# Patient Record
Sex: FEMALE | Race: WHITE | NOT HISPANIC OR LATINO | Employment: FULL TIME | URBAN - NONMETROPOLITAN AREA
[De-identification: names, ages, dates, MRNs, and addresses within clinical notes are randomized per-mention and may not be internally consistent; named-entity substitution may affect disease eponyms.]

---

## 2023-09-06 ENCOUNTER — OFFICE VISIT (OUTPATIENT)
Dept: CARDIOLOGY | Facility: CLINIC | Age: 33
End: 2023-09-06
Payer: MEDICAID

## 2023-09-06 VITALS
HEART RATE: 88 BPM | OXYGEN SATURATION: 99 % | DIASTOLIC BLOOD PRESSURE: 70 MMHG | HEIGHT: 67 IN | BODY MASS INDEX: 37.2 KG/M2 | SYSTOLIC BLOOD PRESSURE: 128 MMHG | WEIGHT: 237 LBS

## 2023-09-06 DIAGNOSIS — G47.00 INSOMNIA, UNSPECIFIED TYPE: ICD-10-CM

## 2023-09-06 DIAGNOSIS — G47.10 HYPERSOMNIA, UNSPECIFIED: Primary | ICD-10-CM

## 2023-09-06 RX ORDER — LAMOTRIGINE 200 MG/1
200 TABLET ORAL DAILY
COMMUNITY
Start: 2023-08-16

## 2023-09-06 RX ORDER — LISDEXAMFETAMINE DIMESYLATE 40 MG/1
40 CAPSULE ORAL EVERY MORNING
COMMUNITY
Start: 2023-08-25

## 2023-09-06 NOTE — PROGRESS NOTES
New Sleep Consult     Date:   2023  Name: Fauzia Khoury  :   1990  PCP: Teresa Reese APRN    Chief Complaint   Patient presents with    [facility]     Sleep evaluation  Neck size - 13       Subjective     History of Present Illness  Fauzia Khoury is a 33 y.o. female who presents today for new patient evaluation for sleep complaints snoring, frequent awakenings, waking up tired, some daytime excessive daytime sleepiness without napping. She has hard time falling asleep and hard time staying asleep. She will get up from bed and organize or do laundry.  These sleep problems have been going on for years. Snoring has been going on for about 10 years. She has been having day time fatigue and some EDS that she can relate to getting up early and staying up late to get the home in order. More recently, she is  but her  lives in the basement and she is co-parenting.     No specialty comments available.    Further details are as follows:    Neck Measurement: 13 inches    [place] Scale is (out of 24): Total score: 3     Estimated average amount of sleep per night: 5  Number of times she wakes up at night: 3  Difficulty falling back asleep: yes  It usually takes 90 minutes to go to sleep.  She feels sleepy upon waking up: yes  Rotating or night shift work: no    Drowsiness/Sleepiness:  She exhibits the following:  excessive daytime sleepiness  excessive daytime fatigue  falls asleep watching TV    Snoring/Breathing:  She exhibits the following:  loud snoring, snores in all sleep positions, awakens with dry mouth, sore throat when waking up in the morning, trouble breathing through nose at night, morning headaches, and Morning brain fog     Head Injury:  She exhibits the following:  No    Reflux:  She describes the following:  eats 2 meals daily   Exercise 3-4 times per week     Narcolepsy:  She exhibits the following:  none    RLS/PLMs: She has a known history of RLS   She  "describes the following:  moves or jerks during sleep  discomfort in legs with an urge to move them    Insomnia:  She describes the following:  problems initiating sleep at night  frequent awakenings  restless sleep    Parasomnia:  She exhibits the following:  None     Weight:       09/06/23  1320   Weight: 108 kg (237 lb)      Weight change in the last  2-3 years:  gain: 45 lbs    The patient's relevant past medical, surgical, family, and social history reviewed and updated in Epic as appropriate.    Past Medical History:   Diagnosis Date    Anxiety     Depression      Past Surgical History:   Procedure Laterality Date    APPENDECTOMY      CHOLECYSTECTOMY       OB History    No obstetric history on file.       No Known Allergies  Prior to Admission medications    Medication Sig Start Date End Date Taking? Authorizing Provider   lamoTRIgine (LaMICtal) 200 MG tablet Take 1 tablet by mouth Daily. 8/16/23  Yes ProviderStefano MD   Vyvanse 40 MG capsule Take 1 capsule by mouth Every Morning 8/25/23  Yes Provider, MD Stefano     Family History   Problem Relation Age of Onset    Obesity Mother     Depression Mother     Anxiety disorder Mother     Diabetes Father     Stroke Father        Objective     Vital Signs:  /70 (BP Location: Left arm, Patient Position: Sitting)   Pulse 88   Ht 170.2 cm (67\")   Wt 108 kg (237 lb)   SpO2 99%   BMI 37.12 kg/m²     Class 2 Severe Obesity (BMI >=35 and <=39.9). Obesity-related health conditions include the following: none. Obesity is worsening. BMI is is above average; BMI management plan is completed. We discussed low calorie, low carb based diet program, portion control, and increasing exercise.        Physical Exam  Constitutional:       Appearance: Normal appearance. She is well-developed.   HENT:      Head: Normocephalic and atraumatic.      Nose: Nose normal.      Mouth/Throat:      Mouth: Mucous membranes are moist.   Eyes:      General: No scleral icterus.   "   Pupils: Pupils are equal, round, and reactive to light.   Neck:      Vascular: No carotid bruit.   Cardiovascular:      Rate and Rhythm: Normal rate and regular rhythm.      Pulses: Normal pulses.           Radial pulses are 2+ on the right side and 2+ on the left side.        Dorsalis pedis pulses are 2+ on the right side and 2+ on the left side.        Posterior tibial pulses are 2+ on the right side and 2+ on the left side.      Heart sounds: Normal heart sounds. No murmur heard.  Pulmonary:      Effort: Pulmonary effort is normal.      Breath sounds: Normal breath sounds. No wheezing or rhonchi.   Abdominal:      General: Bowel sounds are normal.   Musculoskeletal:      Right lower leg: No edema.      Left lower leg: No edema.   Skin:     General: Skin is warm and dry.      Capillary Refill: Capillary refill takes less than 2 seconds.      Coloration: Skin is not cyanotic.      Nails: There is no clubbing.   Neurological:      Mental Status: She is alert and oriented to person, place, and time.      Motor: No weakness.      Gait: Gait normal.   Psychiatric:         Mood and Affect: Mood normal.         Behavior: Behavior normal. Behavior is cooperative.         Thought Content: Thought content normal.         Cognition and Memory: Memory normal.           Labs 1/30/2023 CBC a CMP and amylase and lipase and office note from family provider MARY Manzo on 8/24/2023          Assessment and Plan     Fauzia Khoury is a 33 y.o. female who presents for further evaluation of excessive daytime sleepiness and fatigue, frequent awakenings and hard to fall asleep, hard to stay asleep and obstructive sleep apnea.  We will obtain testing for further evaluation.  The patient will return for follow-up and recommendations after test.  I have discussed weight loss as it pertains to obstructive sleep apnea.    Diagnoses and all orders for this visit:    1. Hypersomnia, unspecified (Primary)  Assessment & Plan:  She  comes in as a new patient today with complaints of snoring, frequent awakenings, waking up tired, some daytime excessive daytime sleepiness without napping.    She reports she will rarely nap due to her ADHD.     Plan Home sleep study for further evaluation of sleep apnea.    Orders:  -     Home Sleep Study; Future    2. Insomnia, unspecified type  Assessment & Plan:  She has hard time falling asleep and hard time staying asleep. She will get up from bed and organize or do laundry.  These sleep problems have been going on for years    She reports that her symptoms are slightly worse since separation from her .    We discussed sleep hygiene and she was given instructions for good healthy sleep practices.    Planning a home sleep test for further evaluation.    Would consider referral for cognitive behavioral therapy if no evidence of sleep apnea.          I discussed the consequences of uncontrolled sleep apnea including hypertension, heart disease, diabetes, stroke, and dementia. I further discussed sleep apnea therapeutic options including CPAP, Weight loss, Oral dental appliance, and surgery.    Report if any new/changing symptoms immediately and Sleep hygiene discussed         Follow Up  Return in about 3 months (around 12/6/2023) for Follow-Up after studies.  Patient was given instructions and counseling regarding her condition or for health maintenance advice. Please see specific information pulled into the AVS if appropriate.

## 2023-09-06 NOTE — PATIENT INSTRUCTIONS
Mediterranean Diet  A Mediterranean diet refers to food and lifestyle choices that are based on the traditions of countries located on the [place]. It focuses on eating more fruits, vegetables, whole grains, beans, nuts, seeds, and heart-healthy fats, and eating less dairy, meat, eggs, and processed foods with added sugar, salt, and fat. This way of eating has been shown to help prevent certain conditions and improve outcomes for people who have chronic diseases, like kidney disease and heart disease.  What are tips for following this plan?  Reading food labels  Check the serving size of packaged foods. For foods such as rice and pasta, the serving size refers to the amount of cooked product, not dry.  Check the total fat in packaged foods. Avoid foods that have saturated fat or trans fats.  Check the ingredient list for added sugars, such as corn syrup.  Shopping    Buy a variety of foods that offer a balanced diet, including:  Fresh fruits and vegetables (produce).  Grains, beans, nuts, and seeds. Some of these may be available in unpackaged forms or large amounts (in bulk).  Fresh seafood.  Poultry and eggs.  Low-fat dairy products.  Buy whole ingredients instead of prepackaged foods.  Buy fresh fruits and vegetables in-season from local [employer] markets.  Buy plain frozen fruits and vegetables.  If you do not have access to quality fresh seafood, buy precooked frozen shrimp or canned fish, such as tuna, salmon, or sardines.  Stock your pantry so you always have certain foods on hand, such as olive oil, canned tuna, canned tomatoes, rice, pasta, and beans.  Cooking  Cook foods with extra-virgin olive oil instead of using butter or other vegetable oils.  Have meat as a side dish, and have vegetables or grains as your main dish. This means having meat in small portions or adding small amounts of meat to foods like pasta or stew.  Use beans or vegetables instead of meat in common dishes like chili or  lasagna.  [place] with different cooking methods. Try roasting, broiling, steaming, and sautéing vegetables.  Add frozen vegetables to soups, stews, pasta, or rice.  Add nuts or seeds for added healthy fats and plant protein at each meal. You can add these to yogurt, salads, or vegetable dishes.  Marinate fish or vegetables using olive oil, lemon juice, garlic, and fresh herbs.  Meal planning  Plan to eat one vegetarian meal one day each week. Try to work up to two vegetarian meals, if possible.  Eat seafood two or more times a week.  Have healthy snacks readily available, such as:  Vegetable sticks with hummus.  Greek yogurt.  Fruit and nut trail mix.  Eat balanced meals throughout the week. This includes:  Fruit: 2-3 servings a day.  Vegetables: 4-5 servings a day.  Low-fat dairy: 2 servings a day.  Fish, poultry, or lean meat: 1 serving a day.  Beans and legumes: 2 or more servings a week.  Nuts and seeds: 1-2 servings a day.  Whole grains: 6-8 servings a day.  Extra-virgin olive oil: 3-4 servings a day.  Limit red meat and sweets to only a few servings a month.  Lifestyle    Cook and eat meals together with your family, when possible.  Drink enough fluid to keep your urine pale yellow.  Be physically active every day. This includes:  Aerobic exercise like running or swimming.  Leisure activities like gardening, walking, or housework.  Get 7-8 hours of sleep each night.  If recommended by your health care provider, drink red wine in moderation. This means 1 glass a day for nonpregnant women and 2 glasses a day for men. A glass of wine equals 5 oz (150 mL).  What foods should I eat?  Fruits  Apples. Apricots. Avocado. Berries. Bananas. Cherries. Dates. Figs. Grapes. [place]. Melon. Oranges. Peaches. Plums. Pomegranate.  Vegetables  Artichokes. Beets. Broccoli. Cabbage. Carrots. Eggplant. Green beans. Chard. Kale. Spinach. Onions. Leeks. Peas. Squash. Tomatoes. Peppers. Radishes.  Grains  Whole-grain pasta. Brown  rice. Bulgur wheat. Polenta. Couscous. Whole-wheat bread. Oatmeal. Quinoa.  Meats and other proteins  Beans. Almonds. Sunflower seeds. Pine nuts. Peanuts. Cod. [place]. Scallops. Shrimp. Tuna. Tilapia. Clams. Oysters. Eggs. Poultry without skin.  Dairy  Low-fat milk. Cheese. Greek yogurt.  Fats and oils  Extra-virgin olive oil. Avocado oil. Grapeseed oil.  Beverages  Water. Red wine. Herbal tea.  Sweets and desserts  Greek yogurt with honey. Baked apples. Poached pears. Trail mix.  Seasonings and condiments  Basil. Cilantro. Coriander. Cumin. Mint. Parsley. [place]. Rosemary. Tarragon. Garlic. Oregano. Thyme. Pepper. Balsamic vinegar. Tahini. Hummus. Tomato sauce. Olives. Mushrooms.  The items listed above may not be a complete list of foods and beverages you can eat. Contact a dietitian for more information.  What foods should I limit?  This is a list of foods that should be eaten rarely or only on special occasions.  Fruits  Fruit canned in syrup.  Vegetables  Deep-fried potatoes (french fries).  Grains  Prepackaged pasta or rice dishes. Prepackaged cereal with added sugar. Prepackaged snacks with added sugar.  Meats and other proteins  Beef. Pork. Lamb. Poultry with skin. Hot dogs. Trinh.  Dairy  Ice cream. Sour cream. Whole milk.  Fats and oils  Butter. Canola oil. Vegetable oil. Beef fat (tallow). Lard.  Beverages  Juice. Sugar-sweetened soft drinks. Beer. Liquor and spirits.  Sweets and desserts  Cookies. Cakes. Pies. Candy.  Seasonings and condiments  Mayonnaise. Pre-made sauces and marinades.  The items listed above may not be a complete list of foods and beverages you should limit. Contact a dietitian for more information.  Summary  The Mediterranean diet includes both food and lifestyle choices.  Eat a variety of fresh fruits and vegetables, beans, nuts, seeds, and whole grains.  Limit the amount of red meat and sweets that you eat.  If recommended by your health care provider, drink red wine in moderation.  This means 1 glass a day for nonpregnant women and 2 glasses a day for men. A glass of wine equals 5 oz (150 mL).  This information is not intended to replace advice given to you by your health care provider. Make sure you discuss any questions you have with your health care provider.  Document Revised: 01/22/2021 Document Reviewed: 11/19/2020  [employer] Patient Education © 2022 [employer] Quality Sleep Information, Adult  Quality sleep is important for your mental and physical health. It also improves your quality of life. Quality sleep means you:  Are asleep for most of the time you are in bed.  Fall asleep within 30 minutes.  Wake up no more than once a night.   Are awake for no longer than 20 minutes if you do wake up during the night.  Most adults need 7-8 hours of quality sleep each night.  How can poor sleep affect me?  If you do not get enough quality sleep, you may have:  Mood swings.  Daytime sleepiness.  Confusion.  Decreased reaction time.  Sleep disorders, such as insomnia and sleep apnea.  Difficulty with:  Solving problems.  Coping with stress.  Paying attention.  These issues may affect your performance and productivity at work, school, and at home. Lack of sleep may also put you at higher risk for accidents, suicide, and risky behaviors.  If you do not get quality sleep you may also be at higher risk for several health problems, including:  Infections.  Type 2 diabetes.  Heart disease.  High blood pressure.  Obesity.  Worsening of long-term conditions, like arthritis, kidney disease, depression, Parkinson's disease, and epilepsy.  What actions can I take to get more quality sleep?  A sign showing that a person should not smoke.         A sign showing that a person should not drink alcohol.      Stick to a sleep schedule. Go to sleep and wake up at about the same time each day. Do not try to sleep less on weekdays and make up for lost sleep on weekends. This does not work.  Try to get about 30  minutes of exercise on most days. Do not exercise 2-3 hours before going to bed.  Limit naps during the day to 30 minutes or less.  Do not use any products that contain nicotine or tobacco, such as cigarettes or e-cigarettes. If you need help quitting, ask your health care provider.  Do not drink caffeinated beverages for at least 8 hours before going to bed. Coffee, tea, and some sodas contain caffeine.  Do not drink alcohol close to bedtime.  Do not eat large meals close to bedtime.  Do not take naps in the late afternoon.  Try to get at least 30 minutes of sunlight every day. Morning sunlight is best.  Make time to relax before bed. Reading, listening to music, or taking a hot bath promotes quality sleep.  Make your bedroom a place that promotes quality sleep. Keep your bedroom dark, quiet, and at a comfortable room temperature. Make sure your bed is comfortable. Take out sleep distractions like TV, a computer, smartphone, and bright lights.  If you are lying awake in bed for longer than 20 minutes, get up and do a relaxing activity until you feel sleepy.  Work with your health care provider to treat medical conditions that may affect sleeping, such as:  Nasal obstruction.  Snoring.  Sleep apnea and other sleep disorders.  Talk to your health care provider if you think any of your prescription medicines may cause you to have difficulty falling or staying asleep.  If you have sleep problems, talk with a sleep consultant. If you think you have a sleep disorder, talk with your health care provider about getting evaluated by a specialist.  Where to find more information  National Sleep Foundation website: https://sleepfoundation.org  National Heart, Lung, and [place] (NHLBI): www.nhlbi.nih.gov/files/docs/public/sleep/healthy_sleep.pdf  Centers for Disease Control and Prevention (CDC): www.cdc.gov/sleep/index.html  Contact a health care provider if you:  Have trouble getting to sleep or staying asleep.  Often  wake up very early in the morning and cannot get back to sleep.  Have daytime sleepiness.  Have daytime sleep attacks of suddenly falling asleep and sudden muscle weakness (narcolepsy).  Have a tingling sensation in your legs with a strong urge to move your legs (restless legs syndrome).  Stop breathing briefly during sleep (sleep apnea).  Think you have a sleep disorder or are taking a medicine that is affecting your quality of sleep.  Summary  Most adults need 7-8 hours of quality sleep each night.  Getting enough quality sleep is an important part of health and well-being.  Make your bedroom a place that promotes quality sleep and avoid things that may cause you to have poor sleep, such as alcohol, caffeine, smoking, and large meals.  Talk to your health care provider if you have trouble falling asleep or staying asleep.  This information is not intended to replace advice given to you by your health care provider. Make sure you discuss any questions you have with your health care provider.  Document Revised: 10/17/2022 Document Reviewed: 10/17/2022  Elsevier Patient Education © 2022 [employer]

## 2023-09-06 NOTE — ASSESSMENT & PLAN NOTE
She has hard time falling asleep and hard time staying asleep. She will get up from bed and organize or do laundry.  These sleep problems have been going on for years    She reports that her symptoms are slightly worse since separation from her .    We discussed sleep hygiene and she was given instructions for good healthy sleep practices.    Planning a home sleep test for further evaluation.    Would consider referral for cognitive behavioral therapy if no evidence of sleep apnea.

## 2023-09-06 NOTE — ASSESSMENT & PLAN NOTE
She comes in as a new patient today with complaints of snoring, frequent awakenings, waking up tired, some daytime excessive daytime sleepiness without napping.    She reports she will rarely nap due to her ADHD.     Plan Home sleep study for further evaluation of sleep apnea.

## 2023-09-19 DIAGNOSIS — G47.10 HYPERSOMNIA, UNSPECIFIED: ICD-10-CM

## 2023-09-19 NOTE — PROGRESS NOTES
She wants to move her to discuss it further in person and about her options with an in lab study. We moved her appointment to 9/25/23. Thanks.